# Patient Record
Sex: FEMALE | NOT HISPANIC OR LATINO | URBAN - METROPOLITAN AREA
[De-identification: names, ages, dates, MRNs, and addresses within clinical notes are randomized per-mention and may not be internally consistent; named-entity substitution may affect disease eponyms.]

---

## 2017-06-07 ENCOUNTER — IMPORTED ENCOUNTER (OUTPATIENT)
Dept: URBAN - METROPOLITAN AREA CLINIC 31 | Facility: CLINIC | Age: 52
End: 2017-06-07

## 2017-06-07 PROCEDURE — 92014 COMPRE OPH EXAM EST PT 1/>: CPT

## 2017-06-07 PROCEDURE — 92015 DETERMINE REFRACTIVE STATE: CPT

## 2017-06-07 NOTE — PATIENT DISCUSSION
1.  Presbyopia-  Pt tried prog and could not get used to. Instead got computer lens which works. Pt using her OTC 2.50 but tired of off and on with them. 2.  Pt intrerested in cls- disc MONO vs MF.  will order both . Right Dominant. [facility] exam and 150. sched fitting3. RTN fitting MONO 1st..  Clariti 1 day 0.50/2.25.4.   RTN 1 yr CE

## 2017-06-21 ENCOUNTER — IMPORTED ENCOUNTER (OUTPATIENT)
Dept: URBAN - METROPOLITAN AREA CLINIC 31 | Facility: CLINIC | Age: 52
End: 2017-06-21

## 2017-06-21 PROCEDURE — 92310 CONTACT LENS FITTING OU: CPT

## 2017-06-21 NOTE — PATIENT DISCUSSION
1.  Fit with Clariti 1 day MONO 0.50/2. 25. Pt got used to cls very easily. Teach I/R and care. Increase wt slowly. Eval 95. Pt has [facility] 2.   RTN 1 week clck

## 2017-06-30 ENCOUNTER — IMPORTED ENCOUNTER (OUTPATIENT)
Dept: URBAN - METROPOLITAN AREA CLINIC 31 | Facility: CLINIC | Age: 52
End: 2017-06-30

## 2017-06-30 NOTE — PATIENT DISCUSSION
1.  Pt doing well with MONO and I/R. Needs more power. Disp new brand Moist 0.75/2.50 to see if helps with shadows and dryness in PM.  wearing 12 hrs. 2.  Pt will call and let me know if likes.

## 2017-10-24 ENCOUNTER — IMPORTED ENCOUNTER (OUTPATIENT)
Dept: URBAN - METROPOLITAN AREA CLINIC 31 | Facility: CLINIC | Age: 52
End: 2017-10-24

## 2017-10-24 PROBLEM — H10.403: Noted: 2017-10-24

## 2017-10-24 PROCEDURE — 92012 INTRM OPH EXAM EST PATIENT: CPT

## 2017-10-24 NOTE — PATIENT DISCUSSION
1.  Allergic Conjunctivitis OU -- Reaction to smoke or other allerggen. The condition was  discussed with the patient. Avoidance of allergens and cool compresses were recommended. Use tears and Rx Lotemax tid 1 week. No cls. 2.  RTn 1 week OC or if better pt can cxl.

## 2017-11-09 ENCOUNTER — IMPORTED ENCOUNTER (OUTPATIENT)
Dept: URBAN - METROPOLITAN AREA CLINIC 31 | Facility: CLINIC | Age: 52
End: 2017-11-09

## 2017-11-09 PROBLEM — H10.403: Noted: 2017-11-09

## 2017-11-09 PROCEDURE — 99213 OFFICE O/P EST LOW 20 MIN: CPT

## 2017-11-09 NOTE — PATIENT DISCUSSION
1.  Allergic Conjunctivitis OU -- Reaction to smoke or other allerggen. Conj on right eye still has some ballooning but left is better. Pt will dc steroid and cont with tears and try stronger allergy med. Taking Claritan daily but will try sudfed or Benadryl. Avoidance of allergens and cool compresses were recommended. Use tears. May take time to resolve. No cls. Pt will keep me posted as to what is going on. No FU needed. 2.  RTn 6/18 CE

## 2018-06-08 ENCOUNTER — IMPORTED ENCOUNTER (OUTPATIENT)
Dept: URBAN - METROPOLITAN AREA CLINIC 31 | Facility: CLINIC | Age: 53
End: 2018-06-08

## 2018-06-08 PROBLEM — H04.123: Noted: 2018-06-08

## 2018-06-08 PROBLEM — H10.403: Noted: 2018-06-08

## 2018-06-08 PROCEDURE — 92015 DETERMINE REFRACTIVE STATE: CPT

## 2018-06-08 PROCEDURE — 92310 CONTACT LENS FITTING OU: CPT

## 2018-06-08 PROCEDURE — 92014 COMPRE OPH EXAM EST PT 1/>: CPT

## 2018-06-08 NOTE — PATIENT DISCUSSION
1.  Dry Eyes OU:  SPK OS>OD--- Pt having hormone issues and meds changing. Start  Pres free systane ultra or refresh Optive artificials tears qid. Encouraged regular use. 2.  Hx  allergic Conjunctivitis OU --  Pt takes claritan most of the year. No ballooning lately. 3.  Pt doing well with MONO and I/R. Needs more power for near. Change to more moist cls  Oasys 1 day  0.75/2.75. Eval 90. wearing 12 hrs. Use more drops.  4.  RTN 1 yr CE  [facility]

## 2019-06-10 ENCOUNTER — IMPORTED ENCOUNTER (OUTPATIENT)
Dept: URBAN - METROPOLITAN AREA CLINIC 31 | Facility: CLINIC | Age: 54
End: 2019-06-10

## 2019-06-10 PROBLEM — H04.123: Noted: 2019-06-10

## 2019-06-10 PROBLEM — H10.403: Noted: 2019-06-10

## 2019-06-10 PROCEDURE — 92014 COMPRE OPH EXAM EST PT 1/>: CPT

## 2019-06-10 PROCEDURE — 92015 DETERMINE REFRACTIVE STATE: CPT

## 2019-06-10 PROCEDURE — 92310 CONTACT LENS FITTING OU: CPT

## 2019-06-10 NOTE — PATIENT DISCUSSION
1.  Dry Eyes OU:  SPK OS>OD--- Pt having hormone issues and meds changing. Start  Pres free systane ultra or refresh Optive artificials tears qid. Encouraged regular use. 2.  Hx  allergic Conjunctivitis OU --  Pt takes claritan most of the year. No ballooning lately. 3.  Pt doing well with MONO and I/R. Needs more power for near. Disp trials of more near left eye  Oasys 1 day  0.75/3.00. Eval 90. wearing 12 hrs. Use more drops.  4.  RTN 1 yr CE  [facility]

## 2020-06-12 ENCOUNTER — IMPORTED ENCOUNTER (OUTPATIENT)
Dept: URBAN - METROPOLITAN AREA CLINIC 31 | Facility: CLINIC | Age: 55
End: 2020-06-12

## 2020-06-12 PROBLEM — H04.123: Noted: 2020-06-12

## 2020-06-12 PROBLEM — H10.403: Noted: 2020-06-12

## 2020-06-12 PROCEDURE — 92014 COMPRE OPH EXAM EST PT 1/>: CPT

## 2020-06-12 PROCEDURE — 92310 CONTACT LENS FITTING OU: CPT

## 2020-06-12 PROCEDURE — V2520 CONTACT LENS HYDROPHILIC: HCPCS

## 2020-06-12 NOTE — PATIENT DISCUSSION
1.  Dry Eyes OU:  SPK OS>OD--- Pt having hormone issues and meds changing. Taking 2 meds that dry her out--One for diet and the other for acne. Start  Pres free systane ultra or refresh Optive artificials tears qid. Encouraged regular use. 2.  Hx  allergic Conjunctivitis OU --  Pt takes claritan most of the year. No ballooning lately. 3.  Pt doing well with MONO and I/R. No rx changes. Oasys 1 day  0.75/3.00. Eval 90. wearing 12 hrs. Use more drops. 4.  NO rx changes.  5.  RTN 1 yr CE  [facility]

## 2021-06-18 ENCOUNTER — IMPORTED ENCOUNTER (OUTPATIENT)
Dept: URBAN - METROPOLITAN AREA CLINIC 31 | Facility: CLINIC | Age: 56
End: 2021-06-18

## 2021-06-18 PROCEDURE — 92014 COMPRE OPH EXAM EST PT 1/>: CPT

## 2021-06-18 PROCEDURE — 92015 DETERMINE REFRACTIVE STATE: CPT

## 2021-06-18 PROCEDURE — 92310 CONTACT LENS FITTING OU: CPT

## 2021-06-18 NOTE — PATIENT DISCUSSION
1.  Dry Eyes OU:  SPK OS>OD--- Pt having hormone issues and meds changing. Taking 2 meds that dry her out--One for diet and the other for acne. Start  Pres free systane ultra or refresh Optive artificials tears qid. Encouraged regular use. 2.  Hx  allergic Conjunctivitis OU --  Pt takes claritan most of the year. No ballooning lately. 3.  Pt doing well with MONO and I/R. No rx changes. Oasys 1 day  0.75/3.00. Eval 120. wearing 12 hrs. Use more drops. 4.  NO rx changes.  5.  RTN 1 yr CE  [facility]

## 2021-08-22 PROBLEM — H10.403: Noted: 2021-08-22

## 2021-08-22 PROBLEM — H04.123: Noted: 2021-08-22

## 2022-04-02 ASSESSMENT — TONOMETRY
OD_IOP_MMHG: 15
OD_IOP_MMHG: 19
OD_IOP_MMHG: 17
OS_IOP_MMHG: 18
OS_IOP_MMHG: 20
OD_IOP_MMHG: 16
OS_IOP_MMHG: 16
OD_IOP_MMHG: 18
OS_IOP_MMHG: 19
OS_IOP_MMHG: 15

## 2022-04-02 ASSESSMENT — VISUAL ACUITY
OS_CC: 20/30+2
OS_CC: 20/30+2
OS_CC: 20/30-1
OD_CC: 20/40+3
OS_CC: 20/40
OD_CC: 20/30+2
OD_CC: 20/30+1
OD_CC: 20/30+2

## 2022-06-27 ENCOUNTER — ESTABLISHED PATIENT (OUTPATIENT)
Dept: URBAN - METROPOLITAN AREA CLINIC 34 | Facility: CLINIC | Age: 57
End: 2022-06-27

## 2022-06-27 DIAGNOSIS — H52.4: ICD-10-CM

## 2022-06-27 DIAGNOSIS — Z01.00: ICD-10-CM

## 2022-06-27 PROCEDURE — 92015 DETERMINE REFRACTIVE STATE: CPT

## 2022-06-27 PROCEDURE — 92014 COMPRE OPH EXAM EST PT 1/>: CPT

## 2022-06-27 PROCEDURE — 92310-5 LEVEL 5 CONTACT LENS MANAGEMENT

## 2022-06-27 ASSESSMENT — VISUAL ACUITY
OU_CC: 20/25-2
OU_CC: 20/25-2

## 2022-06-27 ASSESSMENT — TONOMETRY
OD_IOP_MMHG: 17
OS_IOP_MMHG: 17

## 2022-06-27 NOTE — PATIENT DISCUSSION
Pt wears her cls 15-16 hrs per day _ needs to give her eyes a rest and remove sooner--If pt has trouble with dist she will need a dist rx.  Using OTC when no cls in.

## 2022-06-27 NOTE — PATIENT DISCUSSION
SPK OS>OD--- Pt having hormone issues and meds changing. Taking 2 meds that dry her out--One for diet and the other for acne. Start Pres free systane ultra or refresh Optive artificials tears qid. Encouraged regular use.

## 2023-08-30 ENCOUNTER — COMPREHENSIVE EXAM (OUTPATIENT)
Dept: URBAN - METROPOLITAN AREA CLINIC 34 | Facility: CLINIC | Age: 58
End: 2023-08-30

## 2023-08-30 DIAGNOSIS — H52.4: ICD-10-CM

## 2023-08-30 DIAGNOSIS — Z01.00: ICD-10-CM

## 2023-08-30 PROCEDURE — 92014 COMPRE OPH EXAM EST PT 1/>: CPT

## 2023-08-30 PROCEDURE — 92310-5 LEVEL 5 CONTACT LENS MANAGEMENT

## 2023-08-30 PROCEDURE — 92015 DETERMINE REFRACTIVE STATE: CPT

## 2023-08-30 ASSESSMENT — VISUAL ACUITY
OS_CC: J1
OD_CC: 20/30-3

## 2023-08-30 ASSESSMENT — TONOMETRY
OS_IOP_MMHG: 15
OD_IOP_MMHG: 16

## 2024-09-03 ENCOUNTER — COMPREHENSIVE EXAM (OUTPATIENT)
Dept: URBAN - METROPOLITAN AREA CLINIC 34 | Facility: CLINIC | Age: 59
End: 2024-09-03

## 2024-09-03 DIAGNOSIS — H52.4: ICD-10-CM

## 2024-09-03 DIAGNOSIS — Z46.0: ICD-10-CM

## 2024-09-03 DIAGNOSIS — Z01.00: ICD-10-CM

## 2024-09-03 PROCEDURE — 92014 COMPRE OPH EXAM EST PT 1/>: CPT

## 2024-09-03 PROCEDURE — 92015 DETERMINE REFRACTIVE STATE: CPT

## 2024-09-03 PROCEDURE — 92310-6 LEVEL 6 CONTACT LENS MANAGEMENT: Mod: 21
